# Patient Record
Sex: MALE | Race: WHITE | NOT HISPANIC OR LATINO
[De-identification: names, ages, dates, MRNs, and addresses within clinical notes are randomized per-mention and may not be internally consistent; named-entity substitution may affect disease eponyms.]

---

## 2018-06-28 PROBLEM — Z00.00 ENCOUNTER FOR PREVENTIVE HEALTH EXAMINATION: Status: ACTIVE | Noted: 2018-06-28

## 2020-05-06 ENCOUNTER — APPOINTMENT (OUTPATIENT)
Dept: CARDIOLOGY | Facility: CLINIC | Age: 60
End: 2020-05-06
Payer: COMMERCIAL

## 2020-05-06 VITALS — WEIGHT: 225 LBS | HEIGHT: 73 IN | BODY MASS INDEX: 29.82 KG/M2

## 2020-05-06 DIAGNOSIS — Z87.01 PERSONAL HISTORY OF PNEUMONIA (RECURRENT): ICD-10-CM

## 2020-05-06 DIAGNOSIS — Z78.9 OTHER SPECIFIED HEALTH STATUS: ICD-10-CM

## 2020-05-06 DIAGNOSIS — Z87.09 PERSONAL HISTORY OF OTHER DISEASES OF THE RESPIRATORY SYSTEM: ICD-10-CM

## 2020-05-06 PROCEDURE — 99203 OFFICE O/P NEW LOW 30 MIN: CPT | Mod: 95

## 2020-05-06 RX ORDER — MONTELUKAST SODIUM 10 MG/1
10 TABLET, FILM COATED ORAL
Refills: 0 | Status: ACTIVE | COMMUNITY

## 2020-05-06 RX ORDER — FLUTICASONE PROPIONATE 50 UG/1
SPRAY, METERED NASAL
Refills: 0 | Status: ACTIVE | COMMUNITY

## 2020-05-12 ENCOUNTER — APPOINTMENT (OUTPATIENT)
Dept: CARDIOLOGY | Facility: CLINIC | Age: 60
End: 2020-05-12
Payer: COMMERCIAL

## 2020-05-12 PROCEDURE — 99205 OFFICE O/P NEW HI 60 MIN: CPT | Mod: 95

## 2020-05-13 ENCOUNTER — OUTPATIENT (OUTPATIENT)
Dept: OUTPATIENT SERVICES | Facility: HOSPITAL | Age: 60
LOS: 1 days | End: 2020-05-13
Payer: COMMERCIAL

## 2020-05-13 DIAGNOSIS — Z11.59 ENCOUNTER FOR SCREENING FOR OTHER VIRAL DISEASES: ICD-10-CM

## 2020-05-13 LAB — SARS-COV-2 RNA SPEC QL NAA+PROBE: SIGNIFICANT CHANGE UP

## 2020-05-13 PROCEDURE — U0003: CPT

## 2020-05-14 NOTE — HISTORY OF PRESENT ILLNESS
[Home] : at home, [unfilled] , at the time of the visit. [Medical Office: ([facility])___] : at the medical office located in  [Spouse] : spouse [Patient] : the patient [Self] : self [FreeTextEntry1] : 59 year old male with h/o HTN, HLD reports new onset substernal chest pressure with exertion progressing over last few weeks.  Had episode of pneumonia several weeks ago non COVID and treated and improved..  No current SOB or H/A

## 2020-05-14 NOTE — PHYSICAL EXAM
[General Appearance - Well Developed] : well developed [Normal Appearance] : normal appearance [General Appearance - Well Nourished] : well nourished [Well Groomed] : well groomed [No Deformities] : no deformities [General Appearance - In No Acute Distress] : no acute distress [Normal Conjunctiva] : the conjunctiva exhibited no abnormalities [Eyelids - No Xanthelasma] : the eyelids demonstrated no xanthelasmas [Oriented To Time, Place, And Person] : oriented to person, place, and time [Affect] : the affect was normal [Mood] : the mood was normal [No Anxiety] : not feeling anxious

## 2020-05-14 NOTE — DISCUSSION/SUMMARY
[FreeTextEntry1] : UA- plan cath Friday\par \par HTN- controlled\par \par HLD- labs prior to cath\par \par Followup with Dr Irene

## 2020-05-15 ENCOUNTER — OUTPATIENT (OUTPATIENT)
Dept: OUTPATIENT SERVICES | Facility: HOSPITAL | Age: 60
LOS: 1 days | End: 2020-05-15
Payer: COMMERCIAL

## 2020-05-15 VITALS
RESPIRATION RATE: 18 BRPM | SYSTOLIC BLOOD PRESSURE: 144 MMHG | HEIGHT: 74 IN | WEIGHT: 225.09 LBS | DIASTOLIC BLOOD PRESSURE: 92 MMHG | HEART RATE: 92 BPM | OXYGEN SATURATION: 97 % | TEMPERATURE: 98 F

## 2020-05-15 DIAGNOSIS — Z98.890 OTHER SPECIFIED POSTPROCEDURAL STATES: Chronic | ICD-10-CM

## 2020-05-15 DIAGNOSIS — I20.0 UNSTABLE ANGINA: ICD-10-CM

## 2020-05-15 LAB
ALBUMIN SERPL ELPH-MCNC: 4.4 G/DL — SIGNIFICANT CHANGE UP (ref 3.3–5)
ALP SERPL-CCNC: 93 U/L — SIGNIFICANT CHANGE UP (ref 40–120)
ALT FLD-CCNC: 22 U/L — SIGNIFICANT CHANGE UP (ref 10–45)
ANION GAP SERPL CALC-SCNC: 15 MMOL/L — SIGNIFICANT CHANGE UP (ref 5–17)
AST SERPL-CCNC: 21 U/L — SIGNIFICANT CHANGE UP (ref 10–40)
BILIRUB SERPL-MCNC: 0.8 MG/DL — SIGNIFICANT CHANGE UP (ref 0.2–1.2)
BUN SERPL-MCNC: 14 MG/DL — SIGNIFICANT CHANGE UP (ref 7–23)
CALCIUM SERPL-MCNC: 9.7 MG/DL — SIGNIFICANT CHANGE UP (ref 8.4–10.5)
CHLORIDE SERPL-SCNC: 101 MMOL/L — SIGNIFICANT CHANGE UP (ref 96–108)
CO2 SERPL-SCNC: 23 MMOL/L — SIGNIFICANT CHANGE UP (ref 22–31)
CREAT SERPL-MCNC: 0.97 MG/DL — SIGNIFICANT CHANGE UP (ref 0.5–1.3)
GLUCOSE SERPL-MCNC: 103 MG/DL — HIGH (ref 70–99)
HCT VFR BLD CALC: 44.8 % — SIGNIFICANT CHANGE UP (ref 39–50)
HGB BLD-MCNC: 15.3 G/DL — SIGNIFICANT CHANGE UP (ref 13–17)
MCHC RBC-ENTMCNC: 31.3 PG — SIGNIFICANT CHANGE UP (ref 27–34)
MCHC RBC-ENTMCNC: 34.2 GM/DL — SIGNIFICANT CHANGE UP (ref 32–36)
MCV RBC AUTO: 91.6 FL — SIGNIFICANT CHANGE UP (ref 80–100)
NRBC # BLD: 0 /100 WBCS — SIGNIFICANT CHANGE UP (ref 0–0)
PLATELET # BLD AUTO: 225 K/UL — SIGNIFICANT CHANGE UP (ref 150–400)
POTASSIUM SERPL-MCNC: 3.4 MMOL/L — LOW (ref 3.5–5.3)
POTASSIUM SERPL-SCNC: 3.4 MMOL/L — LOW (ref 3.5–5.3)
PROT SERPL-MCNC: 7.5 G/DL — SIGNIFICANT CHANGE UP (ref 6–8.3)
RBC # BLD: 4.89 M/UL — SIGNIFICANT CHANGE UP (ref 4.2–5.8)
RBC # FLD: 12.9 % — SIGNIFICANT CHANGE UP (ref 10.3–14.5)
SODIUM SERPL-SCNC: 139 MMOL/L — SIGNIFICANT CHANGE UP (ref 135–145)
WBC # BLD: 6.25 K/UL — SIGNIFICANT CHANGE UP (ref 3.8–10.5)
WBC # FLD AUTO: 6.25 K/UL — SIGNIFICANT CHANGE UP (ref 3.8–10.5)

## 2020-05-15 PROCEDURE — 93010 ELECTROCARDIOGRAM REPORT: CPT

## 2020-05-15 PROCEDURE — C1887: CPT

## 2020-05-15 PROCEDURE — 99221 1ST HOSP IP/OBS SF/LOW 40: CPT

## 2020-05-15 PROCEDURE — 93005 ELECTROCARDIOGRAM TRACING: CPT

## 2020-05-15 PROCEDURE — C1894: CPT

## 2020-05-15 PROCEDURE — 99152 MOD SED SAME PHYS/QHP 5/>YRS: CPT

## 2020-05-15 PROCEDURE — 85027 COMPLETE CBC AUTOMATED: CPT

## 2020-05-15 PROCEDURE — C1769: CPT

## 2020-05-15 PROCEDURE — 80053 COMPREHEN METABOLIC PANEL: CPT

## 2020-05-15 PROCEDURE — 93458 L HRT ARTERY/VENTRICLE ANGIO: CPT | Mod: 26

## 2020-05-15 PROCEDURE — 93458 L HRT ARTERY/VENTRICLE ANGIO: CPT

## 2020-05-15 RX ORDER — SODIUM CHLORIDE 9 MG/ML
3 INJECTION INTRAMUSCULAR; INTRAVENOUS; SUBCUTANEOUS EVERY 8 HOURS
Refills: 0 | Status: DISCONTINUED | OUTPATIENT
Start: 2020-05-15 | End: 2020-05-30

## 2020-05-15 NOTE — H&P CARDIOLOGY - PMH
Allergy  seasonal allergy  Asthma    GERD (gastroesophageal reflux disease)    ELIZABETH (obstructive sleep apnea)    Pneumonia

## 2020-05-15 NOTE — H&P CARDIOLOGY - HISTORY OF PRESENT ILLNESS
60 yo  male with PMHx of GERD, Asthma, seasonal allergy, ELIZABETH not on CPAP who presents for cardia cath. pt reports he has been experiencing chest mild discomfort radiate to left shoulder and arm intermittently with SOB, admitted to [facility] on 4/14 for RLL pneumonia. COVID negative at that time, re evaluated by Dr. Irene 5/6 for persistent discomfort with GOMEZ and referred for further cardiac evaluation. COVID negative on 5/13.       Symptoms:        Angina (Class): mild intermittent chest discomfort radiate to left shoulder and arm       Ischemic Symptoms:     Heart Failure: no       Systolic/Diastolic/Combined:        NYHA Class (within 2 weeks):     Assessment of LVEF:       EF: not assessed       Assessed by:        Date:     Prior Cardiac Interventions: none       PCI's:        CABG:     Noninvasive Testing:   Stress Test: Date: not done       Protocol:        Duration of Exercise:        Symptoms:        EKG Changes:        DTS:        Myocardial Imaging:        Risk Assessment:     Echo: not done    Antianginal Therapies: none       Beta Blockers:         Calcium Channel Blockers:        Long Acting Nitrates:        Ranexa:     Associated Risk Factors: HLD       Cerebrovascular Disease: N/A       Chronic Lung Disease: N/A       Peripheral Arterial Disease: N/A       Chronic Kidney Disease (if yes, what is GFR): N/A       Uncontrolled Diabetes (if yes, what is HgbA1C or FBS): N/A       Poorly Controlled Hypertension (if yes, what is SBP): N/A       Morbid Obesity (if yes, what is BMI): N/A       History of Recent Ventricular Arrhythmia: N/A       Inability to Ambulate Safely: N/A       Need for Therapeutic Anticoagulation: N/A       Antiplatelet or Contrast Allergy: N/A

## 2020-05-15 NOTE — H&P CARDIOLOGY - FAMILY HISTORY
Mother  Still living? No  Family history of early liver disease, Age at diagnosis: Age Unknown     Father  Still living? No  Atrial fibrillation, Age at diagnosis: Age Unknown  Family history of cardiac pacemaker, Age at diagnosis: Age Unknown     Sibling  Still living? No  Family history of congenital heart disease, Age at diagnosis: Age Unknown

## 2020-05-19 PROBLEM — G47.33 OBSTRUCTIVE SLEEP APNEA (ADULT) (PEDIATRIC): Chronic | Status: ACTIVE | Noted: 2020-05-15

## 2020-05-19 PROBLEM — J18.9 PNEUMONIA, UNSPECIFIED ORGANISM: Chronic | Status: ACTIVE | Noted: 2020-05-15

## 2020-05-19 PROBLEM — T78.40XA ALLERGY, UNSPECIFIED, INITIAL ENCOUNTER: Chronic | Status: ACTIVE | Noted: 2020-05-15

## 2020-05-19 PROBLEM — J45.909 UNSPECIFIED ASTHMA, UNCOMPLICATED: Chronic | Status: ACTIVE | Noted: 2020-05-15

## 2020-05-19 PROBLEM — K21.9 GASTRO-ESOPHAGEAL REFLUX DISEASE WITHOUT ESOPHAGITIS: Chronic | Status: ACTIVE | Noted: 2020-05-15

## 2020-05-24 NOTE — HISTORY OF PRESENT ILLNESS
[Home] : at home, [unfilled] , at the time of the visit. [Medical Office: ([facility])___] : at the medical office located in  [Spouse] : spouse [FreeTextEntry1] : Patient requested a TELEHEALTH contact at this time to discuss specific cardiovascular issues and associated risk factors. This contact took place via TELEHEALTH link utilizing [employer] Touchpoint software. Consent was obtained from the patient in advance of this communication. The consent form can be found in the "OTHER CONSENTS" section of the patient's medical record. Time spent on this communication was approximately: 40 minutes\par

## 2020-05-28 ENCOUNTER — APPOINTMENT (OUTPATIENT)
Dept: CARDIOLOGY | Facility: CLINIC | Age: 60
End: 2020-05-28

## 2020-05-30 ENCOUNTER — APPOINTMENT (OUTPATIENT)
Dept: CARDIOLOGY | Facility: CLINIC | Age: 60
End: 2020-05-30
Payer: COMMERCIAL

## 2020-05-30 VITALS
WEIGHT: 221 LBS | SYSTOLIC BLOOD PRESSURE: 126 MMHG | RESPIRATION RATE: 14 BRPM | BODY MASS INDEX: 29.29 KG/M2 | HEIGHT: 73 IN | DIASTOLIC BLOOD PRESSURE: 82 MMHG | HEART RATE: 52 BPM

## 2020-05-30 DIAGNOSIS — R07.9 CHEST PAIN, UNSPECIFIED: ICD-10-CM

## 2020-05-30 DIAGNOSIS — K21.9 GASTRO-ESOPHAGEAL REFLUX DISEASE W/OUT ESOPHAGITIS: ICD-10-CM

## 2020-05-30 PROCEDURE — 99214 OFFICE O/P EST MOD 30 MIN: CPT

## 2020-05-30 PROCEDURE — 93000 ELECTROCARDIOGRAM COMPLETE: CPT

## 2020-06-01 NOTE — ASSESSMENT
[FreeTextEntry1] : \par 1.  EKG today demonstrates sinus bradycardia at 52 bpm.  Q-waves in leads II, III, and aVF (? old inferior wall MI).  No acute ischemic changes. \par \par 3.  Chest pain:  Patient status-post diagnostic cardiac catheterization at [facility] where he was found to have a 30% stenosis in the mid-LAD and no other evidence of disease.  Left ventriculography apparently not performed at the time.  Will follow up with echocardiography as a baseline and also to further assess the inferior Qs noted on EKG.  Patient without prior history of MI.  \par \par 4.  Hyperlipidemia:  Patient currently on Atorvastatin 40 mg daily.  Will undergo fasting lipid profile prior to his next office visit. \par \par 5.  Occasional chest discomfort:  Likely represents GERD or possibly asthma at this point given the cardiac cath findings.  Patient advised to follow up with his PCP and possibly GI and pulmonary.  If clinically stable, office visit two months. \par

## 2020-06-01 NOTE — REASON FOR VISIT
[FreeTextEntry1] : Mr. Castle is a pleasant 59-year-old white male with a past medical history significant for hyperlipidemia, asthma, GERD, and sleep apnea, whom recently underwent a diagnostic cardiac catheterization at [facility] for classic symptoms of exertional angina.  Patient was found to have non-obstructive coronary artery disease with a 30% mid-LAD stenosis and no other significant obstructive disease.  Normal left ventricular function was noted.  \par \par

## 2020-06-01 NOTE — PHYSICAL EXAM
[General Appearance - Well Developed] : well developed [General Appearance - In No Acute Distress] : no acute distress [Normal Conjunctiva] : the conjunctiva exhibited no abnormalities [Normal Oral Mucosa] : normal oral mucosa [Heart Rate And Rhythm] : heart rate and rhythm were normal [] : no respiratory distress [Heart Sounds] : normal S1 and S2 [Bowel Sounds] : normal bowel sounds [Abnormal Walk] : normal gait [Skin Color & Pigmentation] : normal skin color and pigmentation [Skin Turgor] : normal skin turgor [Oriented To Time, Place, And Person] : oriented to person, place, and time [Affect] : the affect was normal [Mood] : the mood was normal [FreeTextEntry1] : No edema

## 2020-06-01 NOTE — HISTORY OF PRESENT ILLNESS
[FreeTextEntry1] :  From a cardiac standpoint, Mr. Castle feels well but continues to experience some intermittent episodes of chest discomfort.  I suspect that these are related to underlying asthma and possibly to his GERD.  We had a lengthy discussion regarding the need for avoiding certain foods as well as time that he had dinner and what his sleeping arrangement looks like.

## 2020-06-23 ENCOUNTER — APPOINTMENT (OUTPATIENT)
Dept: CARDIOLOGY | Facility: CLINIC | Age: 60
End: 2020-06-23
Payer: COMMERCIAL

## 2020-06-23 PROCEDURE — 93306 TTE W/DOPPLER COMPLETE: CPT

## 2020-07-16 ENCOUNTER — APPOINTMENT (OUTPATIENT)
Dept: CARDIOLOGY | Facility: CLINIC | Age: 60
End: 2020-07-16

## 2020-07-24 ENCOUNTER — APPOINTMENT (OUTPATIENT)
Dept: CARDIOLOGY | Facility: CLINIC | Age: 60
End: 2020-07-24
Payer: COMMERCIAL

## 2020-07-24 VITALS — WEIGHT: 216 LBS | HEIGHT: 73 IN | BODY MASS INDEX: 28.63 KG/M2

## 2020-07-24 DIAGNOSIS — R42 DIZZINESS AND GIDDINESS: ICD-10-CM

## 2020-07-24 PROCEDURE — 99214 OFFICE O/P EST MOD 30 MIN: CPT | Mod: 95

## 2020-07-24 RX ORDER — RABEPRAZOLE SODIUM 20 MG/1
20 TABLET, DELAYED RELEASE ORAL
Refills: 0 | Status: DISCONTINUED | COMMUNITY
End: 2020-07-24

## 2020-07-24 RX ORDER — NITROGLYCERIN 0.4 MG/1
0.4 TABLET SUBLINGUAL
Qty: 90 | Refills: 3 | Status: DISCONTINUED | COMMUNITY
Start: 2020-05-06 | End: 2020-07-24

## 2020-07-24 NOTE — PHYSICAL EXAM
[General Appearance - Well Developed] : well developed [General Appearance - Well Nourished] : well nourished [General Appearance - In No Acute Distress] : no acute distress [Normal Conjunctiva] : the conjunctiva exhibited no abnormalities [] : no respiratory distress [Skin Color & Pigmentation] : normal skin color and pigmentation [Oriented To Time, Place, And Person] : oriented to person, place, and time [Mood] : the mood was normal [Affect] : the affect was normal

## 2020-07-28 NOTE — HISTORY OF PRESENT ILLNESS
[Home] : at home, [unfilled] , at the time of the visit. [Medical Office: ([facility])___] : at the medical office located in  [FreeTextEntry1] : Patient requested a TELEHEALTH contact at this time to discuss specific cardiovascular issues and associated risk factors. This contact took place via TELEHEALTH link utilizing [employer] Touchpoint software. Consent was obtained from the patient in advance of this communication. The consent form can be found in the "OTHER CONSENTS" section of the patient's medical record. Time spent on this communication was approximately: 25 minutes.\par \par From a cardiac standpoint, Mr. Castle denies exertional chest pain or shortness of breath.  He has been experiencing some positional lightheadedness but no syncope.  He states that he will undergo a cholecystectomy in the near future.

## 2020-07-28 NOTE — REASON FOR VISIT
[FreeTextEntry1] : Mr. Castle is a pleasant 59-year-old white male with a past medical history significant for hyperlipidemia, mild coronary artery disease with a noted 30% mid-LAD stenosis on cardiac catheterization this year as well as asthma, GERD, and sleep apnea, who presents for telemedicine follow up assessment.  \par \par

## 2020-10-01 ENCOUNTER — NON-APPOINTMENT (OUTPATIENT)
Age: 60
End: 2020-10-01

## 2020-10-05 ENCOUNTER — RX RENEWAL (OUTPATIENT)
Age: 60
End: 2020-10-05

## 2020-10-06 ENCOUNTER — APPOINTMENT (OUTPATIENT)
Dept: CARDIOLOGY | Facility: CLINIC | Age: 60
End: 2020-10-06
Payer: COMMERCIAL

## 2020-10-06 PROCEDURE — 93880 EXTRACRANIAL BILAT STUDY: CPT

## 2020-10-19 ENCOUNTER — APPOINTMENT (OUTPATIENT)
Dept: CARDIOLOGY | Facility: CLINIC | Age: 60
End: 2020-10-19
Payer: COMMERCIAL

## 2020-10-19 VITALS
HEIGHT: 73 IN | TEMPERATURE: 98.8 F | HEART RATE: 67 BPM | SYSTOLIC BLOOD PRESSURE: 100 MMHG | DIASTOLIC BLOOD PRESSURE: 68 MMHG | BODY MASS INDEX: 27.17 KG/M2 | RESPIRATION RATE: 14 BRPM | WEIGHT: 205 LBS

## 2020-10-19 PROCEDURE — 93000 ELECTROCARDIOGRAM COMPLETE: CPT

## 2020-10-19 PROCEDURE — 99214 OFFICE O/P EST MOD 30 MIN: CPT

## 2020-10-19 RX ORDER — FLUTICASONE FUROATE AND VILANTEROL TRIFENATATE 200; 25 UG/1; UG/1
200-25 POWDER RESPIRATORY (INHALATION)
Refills: 0 | Status: DISCONTINUED | COMMUNITY
End: 2020-10-19

## 2020-10-19 RX ORDER — LEVALBUTEROL TARTRATE 45 UG/1
45 AEROSOL, METERED ORAL
Refills: 0 | Status: DISCONTINUED | COMMUNITY
End: 2020-10-19

## 2020-10-20 NOTE — HISTORY OF PRESENT ILLNESS
[FreeTextEntry1] : From a cardiac standpoint, Mr. Castle denies exertional chest pain, shortness of breath, palpitations, lightheadedness, or syncope.  He recently underwent carotid duplex for assessment of peripheral vascular disease.

## 2020-10-20 NOTE — ASSESSMENT
[FreeTextEntry1] : 1.  EKG today reveals normal sinus rhythm at 67 bpm.  Normal intervals.  No evidence of ischemia. \par \par 2.  Non-obstructive coronary artery disease:  Patient without chest pain or shortness of breath and physically active. \par \par 3.  Hyperlipidemia:  Recent lipid profile demonstrates a total cholesterol of 139, HDL 55, LDL 70, triglycerides 59.  Patient is advised to continue with current medications as well as follow low-fat / low-cholesterol diet.  \par \par 4.  Peripheral vascular disease:  Recent carotid duplex revealed mild plaquing of the right internal carotid artery.  The left internal carotid artery is normal.  No other significant findings.  Given these findings, patient was encouraged to continue current statin therapy as well as low-dose aspirin.  \par \par 5.  Shortness of breath while bending over:  Patient admits to shortness of breath only while bending over to pick things up or tie his shows.  I have discussed this with him and have told him that essentially this is a mechanical issue and not a cardiopulmonary problem.  Patient advised on further weight loss through a low-caloric diet and regular weight loss. \par \par 6.  Family history of ruptured abdominal aortic aneurysm:  Patient states that his father  from a ruptured aortic aneurysm and is concerned about his aorta.  Will schedule abdominal ultrasound to be performed at some point between now and his next office visit.  \par

## 2020-10-20 NOTE — PHYSICAL EXAM
[General Appearance - Well Developed] : well developed [General Appearance - In No Acute Distress] : no acute distress [General Appearance - Well Nourished] : well nourished [Normal Conjunctiva] : the conjunctiva exhibited no abnormalities [Normal Oral Mucosa] : normal oral mucosa [] : no respiratory distress [Heart Rate And Rhythm] : heart rate and rhythm were normal [Heart Sounds] : normal S1 and S2 [Bowel Sounds] : normal bowel sounds [Abnormal Walk] : normal gait [Skin Color & Pigmentation] : normal skin color and pigmentation [Oriented To Time, Place, And Person] : oriented to person, place, and time [Affect] : the affect was normal [Mood] : the mood was normal [FreeTextEntry1] : No edema

## 2020-10-20 NOTE — REASON FOR VISIT
[FreeTextEntry1] : Mr. Castle is a pleasant 60-year-old white male with a past medical history significant for hyperlipidemia as well as mild coronary artery disease with noted 30% mid-LAD stenosis on cardiac catheterization earlier this year as well as a history of asthma, GERD, and sleep apnea, who presents for follow up evaluation.  \par \par

## 2021-04-19 ENCOUNTER — APPOINTMENT (OUTPATIENT)
Dept: CARDIOLOGY | Facility: CLINIC | Age: 61
End: 2021-04-19
Payer: COMMERCIAL

## 2021-04-19 VITALS
SYSTOLIC BLOOD PRESSURE: 126 MMHG | TEMPERATURE: 98 F | HEART RATE: 81 BPM | RESPIRATION RATE: 14 BRPM | WEIGHT: 210 LBS | HEIGHT: 73 IN | DIASTOLIC BLOOD PRESSURE: 60 MMHG | BODY MASS INDEX: 27.83 KG/M2

## 2021-04-19 PROCEDURE — 99072 ADDL SUPL MATRL&STAF TM PHE: CPT

## 2021-04-19 PROCEDURE — 99214 OFFICE O/P EST MOD 30 MIN: CPT

## 2021-04-19 PROCEDURE — 93000 ELECTROCARDIOGRAM COMPLETE: CPT

## 2021-04-19 RX ORDER — ASPIRIN 81 MG/1
81 TABLET, CHEWABLE ORAL
Qty: 90 | Refills: 3 | Status: DISCONTINUED | COMMUNITY
Start: 2020-05-06 | End: 2021-04-19

## 2021-04-19 RX ORDER — FAMOTIDINE 10 MG/1
TABLET, FILM COATED ORAL
Refills: 0 | Status: DISCONTINUED | COMMUNITY
End: 2021-04-19

## 2021-04-21 NOTE — ASSESSMENT
[FreeTextEntry1] : 1.  EKG today reveals sinus rhythm at 81 bpm.  Qs in leads II, III, and aVF, with tall R-waves in leads V1, V2 suggesting prior inferoposterior infarct.  These EKG changes do not correlate with echocardiographic findings from last year where he is noted to have normal left ventricular dimensions and wall motion without segmental abnormalities noted at either the inferior or posterior segments.  Normal left ventricular ejection fraction noted.\par \par 2.  Hyperlipidemia:  Patient recently underwent fasting bloodwork which revealed a total cholesterol of 143, HDL 55, LDL 74, triglycerides 70.  Patient is advised to continue with current statin therapy as well as low-dose aspirin given his known non-obstructive coronary artery disease.  Regular aerobic exercise was discussed as well.  If clinically stable, will have patient return in approximately 12 months for reassessment.   \par \par \par

## 2021-04-21 NOTE — HISTORY OF PRESENT ILLNESS
[FreeTextEntry1] : From a cardiac standpoint, Mr. Castle denies exertional chest pain, shortness of breath, palpitations, lightheadedness, or syncope, and remains physically active.

## 2021-04-21 NOTE — REASON FOR VISIT
[FreeTextEntry1] : Mr. Castle is a pleasant 60-year-old white male with a past medical history significant for hyperlipidemia as well as non-obstructive coronary artery disease involving his LAD, asthma, GERD, and sleep apnea, who presents for follow up evaluation.  \par \par

## 2021-07-26 ENCOUNTER — RX RENEWAL (OUTPATIENT)
Age: 61
End: 2021-07-26

## 2021-09-07 ENCOUNTER — EMERGENCY (EMERGENCY)
Facility: HOSPITAL | Age: 61
LOS: 0 days | Discharge: HOME | End: 2021-09-07
Payer: COMMERCIAL

## 2021-09-07 VITALS
RESPIRATION RATE: 18 BRPM | HEART RATE: 65 BPM | DIASTOLIC BLOOD PRESSURE: 81 MMHG | OXYGEN SATURATION: 98 % | SYSTOLIC BLOOD PRESSURE: 120 MMHG | TEMPERATURE: 98 F

## 2021-09-07 VITALS — OXYGEN SATURATION: 100 % | HEIGHT: 73 IN | WEIGHT: 214.95 LBS
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
## 2021-09-07 DIAGNOSIS — S09.90XA UNSPECIFIED INJURY OF HEAD, INITIAL ENCOUNTER: ICD-10-CM

## 2021-09-07 DIAGNOSIS — J45.909 UNSPECIFIED ASTHMA, UNCOMPLICATED: ICD-10-CM

## 2021-09-07 DIAGNOSIS — Z98.890 OTHER SPECIFIED POSTPROCEDURAL STATES: Chronic | ICD-10-CM

## 2021-09-07 DIAGNOSIS — K21.9 GASTRO-ESOPHAGEAL REFLUX DISEASE WITHOUT ESOPHAGITIS: ICD-10-CM
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
## 2021-09-07 DIAGNOSIS — G47.33 OBSTRUCTIVE SLEEP APNEA (ADULT) (PEDIATRIC): ICD-10-CM

## 2021-09-07 DIAGNOSIS — S01.01XA LACERATION WITHOUT FOREIGN BODY OF SCALP, INITIAL ENCOUNTER: ICD-10-CM

## 2021-09-07 PROCEDURE — 99283 EMERGENCY DEPT VISIT LOW MDM: CPT | Mod: 25

## 2021-09-07 PROCEDURE — 12002 RPR S/N/AX/GEN/TRNK2.6-7.5CM: CPT

## 2021-09-07 NOTE — ED ADULT TRIAGE NOTE - CHIEF COMPLAINT QUOTE
pt a&O x4 walk in from home. C.o head injury today to metal object. puncture wound to anterior scalp no bleeding at this time. NO LOC. pt received tetanus within last 10 years. no dizziness. pain 3/10.

## 2021-09-07 NOTE — ED PROVIDER NOTE - NS ED ROS FT
Constitutional: (-) Fever, (-) Chills  Skin: (+) Wounds  Eyes: (-) Visual changes, (-) Discharge, (-) Redness  Ears: (-) Hearing loss, (-)Tinnitus, (-) Ear pain  CV: (-) Chest pain, (-) Palpitations  Resp: (-) Cough, (-) Shortness of breath  GI: (-) Abdominal pain, (-) Nausea, (-) Vomiting  : (-) Dysuria  MSK: (-) Myalgias, (-) Neck pain  Neuro: (-) Loss of consciousness, (-) Headache, (-) Confusion

## 2021-09-07 NOTE — ED PROVIDER NOTE - OBJECTIVE STATEMENT
61y Male with no sig PMHx presents to the ER for head injury. Reports doing work in his basement, when he stood up and hit his head on a metal corner. Denies loosening consciousness or falling and hitting his head. Denies blood thinners. Reports cut to the back of the head, bleeding controlled. Denies headache, dizziness, lightheadedness, acute changes in vision, nausea or vomiting. Tetanus within the last 7 years.

## 2021-09-07 NOTE — ED ADULT NURSE NOTE - NSICDXFAMILYHX_GEN_ALL_CORE_FT
FAMILY HISTORY:  Father  Still living? No  Atrial fibrillation, Age at diagnosis: Age Unknown  Family history of cardiac pacemaker, Age at diagnosis: Age Unknown    Mother  Still living? No  Family history of early liver disease, Age at diagnosis: Age Unknown    Sibling  Still living? No  Family history of congenital heart disease, Age at diagnosis: Age Unknown

## 2021-09-07 NOTE — ED ADULT NURSE NOTE - NSICDXPASTMEDICALHX_GEN_ALL_CORE_FT
PAST MEDICAL HISTORY:  Allergy seasonal allergy    Asthma     GERD (gastroesophageal reflux disease)     ELIZABETH (obstructive sleep apnea)     Pneumonia

## 2021-09-07 NOTE — ED PROVIDER NOTE - NSFOLLOWUPINSTRUCTIONS_ED_ALL_ED_FT
Today you were seen in the ER for laceration of the scalp.     RETURN TO THE ER IN 10-14 DAYS FOR REMOVAL OF STAPLES.     Keep area clean and dry for the first 24 hours. After, let soap and water run down area. Do not scrub.     Laceration    A laceration is a cut that goes through all of the layers of the skin and into the tissue that is right under the skin. Some lacerations heal on their own. Others need to be closed with skin adhesive strips, skin glue, stitches (sutures), or staples. Proper laceration care minimizes the risk of infection and helps the laceration to heal better.  If non-absorbable stitches or staples have been placed, they must be taken out within the time frame instructed by your healthcare provider.    SEEK IMMEDIATE MEDICAL CARE IF YOU HAVE ANY OF THE FOLLOWING SYMPTOMS: swelling around the wound, worsening pain, drainage from the wound, red streaking going away from your wound, inability to move finger or toe near the laceration, or discoloration of skin near the laceration, lightheadedness, dizziness, headache, nausea or vomiting.    Take Tylenol 650mg (Two 325 mg pills) every 4-6 hours as needed for pain.    Take Motrin 600 mg every 8 hours as needed for moderate pain-- take with food.    Advance activity as tolerated.     Continue all previously prescribed medications as directed unless otherwise instructed.     Follow up with your primary care physician in 48-72 hours- bring copies of your results.

## 2021-09-07 NOTE — ED PROVIDER NOTE - PATIENT PORTAL LINK FT
You can access the FollowMyHealth Patient Portal offered by [facility] by registering at the following website: [email or web address]. By joining [facility]’s FollowMyHealth portal, you will also be able to view your health information using other applications (apps) compatible with our system.

## 2021-09-14 ENCOUNTER — EMERGENCY (EMERGENCY)
Facility: HOSPITAL | Age: 61
LOS: 0 days | Discharge: HOME | End: 2021-09-14
Payer: COMMERCIAL

## 2021-09-14 VITALS
HEIGHT: 73 IN | HEART RATE: 56 BPM | WEIGHT: 214.95 LBS | OXYGEN SATURATION: 98 % | RESPIRATION RATE: 20 BRPM | DIASTOLIC BLOOD PRESSURE: 77 MMHG | TEMPERATURE: 98 F | SYSTOLIC BLOOD PRESSURE: 120 MMHG

## 2021-09-14 DIAGNOSIS — Z98.890 OTHER SPECIFIED POSTPROCEDURAL STATES: Chronic | ICD-10-CM

## 2021-09-14 DIAGNOSIS — S01.01XD LACERATION WITHOUT FOREIGN BODY OF SCALP, SUBSEQUENT ENCOUNTER: ICD-10-CM

## 2021-09-14 DIAGNOSIS — G47.33 OBSTRUCTIVE SLEEP APNEA (ADULT) (PEDIATRIC): ICD-10-CM
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
## 2021-09-14 DIAGNOSIS — Z87.01 PERSONAL HISTORY OF PNEUMONIA (RECURRENT): ICD-10-CM

## 2021-09-14 DIAGNOSIS — Z48.02 ENCOUNTER FOR REMOVAL OF SUTURES: ICD-10-CM

## 2021-09-14 DIAGNOSIS — J45.909 UNSPECIFIED ASTHMA, UNCOMPLICATED: ICD-10-CM

## 2021-09-14 DIAGNOSIS — I25.10 ATHEROSCLEROTIC HEART DISEASE OF NATIVE CORONARY ARTERY WITHOUT ANGINA PECTORIS: ICD-10-CM

## 2021-09-14 PROCEDURE — L9995: CPT

## 2021-09-14 RX ORDER — MONTELUKAST 4 MG/1
1 TABLET, CHEWABLE ORAL
Qty: 0 | Refills: 0 | DISCHARGE

## 2021-09-14 RX ORDER — FLUTICASONE PROPIONATE 50 MCG
1 SPRAY, SUSPENSION NASAL
Qty: 0 | Refills: 0 | DISCHARGE

## 2021-09-14 RX ORDER — ASPIRIN/CALCIUM CARB/MAGNESIUM 324 MG
1 TABLET ORAL
Qty: 0 | Refills: 0 | DISCHARGE

## 2021-09-14 RX ORDER — CETIRIZINE HYDROCHLORIDE 10 MG/1
1 TABLET ORAL
Qty: 0 | Refills: 0 | DISCHARGE

## 2021-09-14 RX ORDER — ATORVASTATIN CALCIUM 80 MG/1
1 TABLET, FILM COATED ORAL
Qty: 0 | Refills: 0 | DISCHARGE

## 2021-09-14 RX ORDER — FAMOTIDINE 10 MG/ML
1 INJECTION INTRAVENOUS
Qty: 0 | Refills: 0 | DISCHARGE

## 2021-09-14 RX ORDER — FLUTICASONE FUROATE AND VILANTEROL TRIFENATATE 100; 25 UG/1; UG/1
1 POWDER RESPIRATORY (INHALATION)
Qty: 0 | Refills: 0 | DISCHARGE

## 2021-09-14 RX ORDER — AZELASTINE 137 UG/1
2 SPRAY, METERED NASAL
Qty: 0 | Refills: 0 | DISCHARGE

## 2021-09-14 NOTE — ED PROVIDER NOTE - NSFOLLOWUPINSTRUCTIONS_ED_ALL_ED_FT
Today you were seen in the ER for staple removal.     Advance activity as tolerated.     Continue all previously prescribed medications as directed unless otherwise instructed.     Follow up with your primary care physician in 48-72 hours- bring copies of your results.     Return to the ER for worsening or persistent symptoms, and/or ANY NEW OR CONCERNING SYMPTOMS including but not limited to fever, chills, redness, swelling, pus drainage, headache or dizziness.

## 2021-09-14 NOTE — ED PROVIDER NOTE - OBJECTIVE STATEMENT
61y Male with no sig PMH presents to the ER for staple removal. Patient reports hitting head last week, receiving 3 staples in scalp. Denies fever, chills, pus drainage, headache, nausea, vomiting.

## 2021-09-14 NOTE — ED ADULT NURSE NOTE - NSIMPLEMENTINTERV_GEN_ALL_ED
Implemented All Universal Safety Interventions:  [place] to call system. Call bell, personal items and telephone within reach. Instruct patient to call for assistance. Room bathroom lighting operational. Non-slip footwear when patient is off stretcher. Physically safe environment: no spills, clutter or unnecessary equipment. Stretcher in lowest position, wheels locked, appropriate side rails in place.

## 2021-09-14 NOTE — ED PROVIDER NOTE - CLINICAL SUMMARY MEDICAL DECISION MAKING FREE TEXT BOX
61y Male with no sig PMH presents to the ER for staple removal. 3 staples to scalp. Denies fever, chills, pus drainage, headache, nausea, vomiting. Will remove sutures and discharge.

## 2021-09-14 NOTE — ED ADULT NURSE NOTE - CAS TRG GENERAL NORM CIRC DET
Please do another GI referral for Dr. Ace Crawley we have one for Dr. Gamez, but patient has appt with Dr Crawley tomorrow. Thank You.  
Strong peripheral pulses

## 2021-09-14 NOTE — ED PROVIDER NOTE - SKIN, MLM
Skin normal color for race, warm, dry and intact. No evidence of rash. Well healed laceration to scalp. 3 staples present, no active bleeding, swelling or pus drainage.

## 2021-09-14 NOTE — ED PROVIDER NOTE - NS ED ROS FT
Constitutional: (-) Fever, (-) Chills  Skin: (-) Color changes, (-) Rashes, (-) Wounds  Eyes: (-) Visual changes, (-) Discharge, (-) Redness  Ears: (-) Hearing loss, (-)Tinnitus  CV: (-) Chest pain  Resp: (-) Cough, (-) Shortness of breath  GI: (-) Abdominal pain, (-) Nausea, (-) Vomiting  : (-) Dysuria   MSK: (-) Myalgias  Neuro: (-) Loss of consciousness, (-) Headache

## 2021-09-14 NOTE — ED PROVIDER NOTE - PATIENT PORTAL LINK FT
You can access the FollowMyHealth Patient Portal offered by [facility] by registering at the following website: [email or web address]. By joining [facility]’s FollowMyHealth portal, you will also be able to view your health information using other applications (apps) compatible with our system.

## 2021-09-14 NOTE — ED PROVIDER NOTE - NSICDXPASTMEDICALHX_GEN_ALL_CORE_FT
PAST MEDICAL HISTORY:  Allergy seasonal allergy    Asthma     CAD S/P percutaneous coronary angioplasty     CAD S/P percutaneous coronary angioplasty     GERD (gastroesophageal reflux disease)     ELIZABETH (obstructive sleep apnea)     Pneumonia

## 2022-04-11 ENCOUNTER — APPOINTMENT (OUTPATIENT)
Dept: CARDIOLOGY | Facility: CLINIC | Age: 62
End: 2022-04-11
Payer: COMMERCIAL

## 2022-04-11 VITALS
RESPIRATION RATE: 14 BRPM | DIASTOLIC BLOOD PRESSURE: 82 MMHG | SYSTOLIC BLOOD PRESSURE: 128 MMHG | HEIGHT: 73 IN | BODY MASS INDEX: 30.35 KG/M2 | WEIGHT: 229 LBS | HEART RATE: 56 BPM

## 2022-04-11 PROCEDURE — 99214 OFFICE O/P EST MOD 30 MIN: CPT

## 2022-04-11 PROCEDURE — 93000 ELECTROCARDIOGRAM COMPLETE: CPT

## 2022-04-13 NOTE — ASSESSMENT
[FreeTextEntry1] : 1.  EKG today reveals sinus bradycardia at 56 bpm.  Qs in leads II, III, and aVF.  Tall Rs in leads V1, V2.  These are chronic EKG changes for this patient who has normal wall motion on echocardiography. \par \par 2.  Non-[id number] coronary artery disease:  Clinically stable without chest pain or shortness of breath. \par \par 3.  Hyperlipidemia:  Patient recently underwent fasting bloodwork through his PCP’s office.  Attempting to obtain those labs now for review.  I have advised him to follow a strict low-fat / low-cholesterol diet and continue current statin therapy.  \par \par 4.  Recent weight gain:  Clinically not underactive but clearly following a very carbohydrate-rich diet.  I have advised him to begin cutting back on carbs while continuing his exercise.  If clinically stable, office visit 12 months.

## 2022-04-13 NOTE — HISTORY OF PRESENT ILLNESS
[FreeTextEntry1] : From a cardiac standpoint, Mr. Castle denies exertional chest pain, shortness of breath, or other cardiac symptoms.  He states that he is physically active but has gained some 15 pounds since last year from a poor diet high in carbohydrates.  \par \par \par

## 2022-04-13 NOTE — REASON FOR VISIT
[FreeTextEntry1] : Mr. Castle is a pleasant 61-year-old white male with a past medical history significant for hyperlipidemia as well as non-obstructive coronary artery disease with known 30% stenosis of his LAD as well as asthma, GERD, and sleep apnea, who presents for follow up evaluation.  \par \par

## 2022-05-02 ENCOUNTER — RX RENEWAL (OUTPATIENT)
Age: 62
End: 2022-05-02

## 2022-05-02 ENCOUNTER — APPOINTMENT (OUTPATIENT)
Dept: ORTHOPEDIC SURGERY | Facility: CLINIC | Age: 62
End: 2022-05-02
Payer: COMMERCIAL

## 2022-05-02 VITALS — WEIGHT: 225 LBS | BODY MASS INDEX: 29.82 KG/M2 | HEIGHT: 73 IN

## 2022-05-02 PROCEDURE — 99214 OFFICE O/P EST MOD 30 MIN: CPT

## 2022-05-02 NOTE — REASON FOR VISIT
[FreeTextEntry2] : pt states RT ankle has been bothering them since last week. states there was no injury or fall

## 2022-05-02 NOTE — ASSESSMENT
[FreeTextEntry1] : wbat\par MDP\par arch support\par PT\par f/up 1 months\par \par A Medrol dose Temo was prescribed today. Patient understands that while taking the Medrol, they shouldn't be taking any other anti-inflammatories. Pt understands and all questions were answered.\par \par

## 2022-05-02 NOTE — IMAGING
[Left] : left ankle [Weight -] : weightbearing [There are no fractures, subluxations or dislocations. No significant abnormalities are seen] : There are no fractures, subluxations or dislocations. No significant abnormalities are seen

## 2022-05-02 NOTE — HISTORY OF PRESENT ILLNESS
[5] : 5 [1] : 2 [Dull/Aching] : dull/aching [Sharp] : sharp [Shooting] : shooting [Tingling] : tingling [Constant] : constant [Meds] : meds [Ice] : ice [de-identified] : 5/2/22: Patient is a 60 yo male c/o right ankle pain for 10 days with no specific injury. Was seen by [facility] where they took xrays. Having n/t in toes. Taking tylenol as needed which gives little relief. Pain is worse with walking. No previous injuries or surgeries to right ankle. no dm/tob. Occupation: Property Management  [] : no [FreeTextEntry7] : up the leg and into the toes [de-identified] : 4/26/22 [de-identified] : [facility] [de-identified] : x-ray  [de-identified] : ankle brace

## 2022-05-02 NOTE — PHYSICAL EXAM
[Right] : right foot and ankle [NL (40)] : plantar flexion 40 degrees [NL 30)] : inversion 30 degrees [NL (20)] : eversion 20 degrees [5___] : eversion 5[unfilled]/5 [2+] : dorsalis pedis pulse: 2+ [] : non-antalgic

## 2022-06-06 ENCOUNTER — APPOINTMENT (OUTPATIENT)
Dept: ORTHOPEDIC SURGERY | Facility: CLINIC | Age: 62
End: 2022-06-06

## 2022-10-04 ENCOUNTER — APPOINTMENT (OUTPATIENT)
Dept: ULTRASOUND IMAGING | Facility: CLINIC | Age: 62
End: 2022-10-04

## 2022-10-04 PROCEDURE — 76700 US EXAM ABDOM COMPLETE: CPT

## 2022-10-07 ENCOUNTER — TRANSCRIPTION ENCOUNTER (OUTPATIENT)
Age: 62
End: 2022-10-07

## 2023-03-27 ENCOUNTER — APPOINTMENT (OUTPATIENT)
Dept: CARDIOLOGY | Facility: CLINIC | Age: 63
End: 2023-03-27
Payer: COMMERCIAL

## 2023-03-27 VITALS
HEIGHT: 73 IN | WEIGHT: 227 LBS | DIASTOLIC BLOOD PRESSURE: 78 MMHG | HEART RATE: 66 BPM | SYSTOLIC BLOOD PRESSURE: 122 MMHG | BODY MASS INDEX: 30.09 KG/M2 | RESPIRATION RATE: 14 BRPM

## 2023-03-27 DIAGNOSIS — M25.571 PAIN IN RIGHT ANKLE AND JOINTS OF RIGHT FOOT: ICD-10-CM

## 2023-03-27 DIAGNOSIS — R29.91 UNSPECIFIED SYMPTOMS AND SIGNS INVOLVING THE MUSCULOSKELETAL SYSTEM: ICD-10-CM

## 2023-03-27 PROCEDURE — 99214 OFFICE O/P EST MOD 30 MIN: CPT | Mod: 25

## 2023-03-27 PROCEDURE — 93000 ELECTROCARDIOGRAM COMPLETE: CPT

## 2023-03-27 RX ORDER — ATORVASTATIN CALCIUM 40 MG/1
40 TABLET, FILM COATED ORAL
Qty: 90 | Refills: 3 | Status: DISCONTINUED | COMMUNITY
Start: 2020-05-06 | End: 2023-03-27

## 2023-03-28 PROBLEM — M25.571 SINUS TARSI SYNDROME OF RIGHT ANKLE: Status: ACTIVE | Noted: 2022-05-02

## 2023-03-29 NOTE — REASON FOR VISIT
[FreeTextEntry1] : Mr. Castle is a pleasant 62-year-old white male with a past medical history significant for hyperlipidemia as well as non-obstructive coronary artery disease with known 30% stenosis of his LAD as well as asthma, GERD, and sleep apnea, who presents for follow up evaluation.  \par \par

## 2023-03-29 NOTE — ASSESSMENT
[FreeTextEntry1] : \par 1. EKG today reveals sinus rhythm at 66 bpm.  Qs in leads II, III, and aVF.  Non-specific ST-T changes.  \par \par 2. Hyperlipidemia: Review of recent bloodwork demonstrates a total cholesterol of 154, HDL 50, LDL 87, triglycerides 190.  Patient currently on 20 mg of Atorvastatin q.h.s.  Have advised him to augment this to 40 mg q.h.s. and follow a stricter low-fat / low-cholesterol diet given his known mild coronary artery disease.  Will undergo fasting bloodwork in approximately eight weeks.  \par \par 3. Lower extremity vascular calcification:  Patient asymptomatic but will undergo lower extremity arterial duplex for further evaluation.  If clinically stable, office visit three months. \par \par \par

## 2023-03-29 NOTE — HISTORY OF PRESENT ILLNESS
[FreeTextEntry1] : From a cardiac standpoint, Mr. Csatle denies exertional chest pain, shortness of breath, or other cardiac symptoms.  He recently underwent x-rays for a foot injury and was found to have foot calcifications, possibly suggesting peripheral artery disease.  \par \par

## 2023-04-17 ENCOUNTER — APPOINTMENT (OUTPATIENT)
Dept: CARDIOLOGY | Facility: CLINIC | Age: 63
End: 2023-04-17
Payer: COMMERCIAL

## 2023-04-17 PROCEDURE — 93925 LOWER EXTREMITY STUDY: CPT

## 2023-06-26 ENCOUNTER — LABORATORY RESULT (OUTPATIENT)
Age: 63
End: 2023-06-26

## 2023-08-09 ENCOUNTER — APPOINTMENT (OUTPATIENT)
Dept: CARDIOLOGY | Facility: CLINIC | Age: 63
End: 2023-08-09
Payer: COMMERCIAL

## 2023-08-09 VITALS
RESPIRATION RATE: 16 BRPM | HEART RATE: 53 BPM | WEIGHT: 224 LBS | SYSTOLIC BLOOD PRESSURE: 112 MMHG | HEIGHT: 73 IN | DIASTOLIC BLOOD PRESSURE: 78 MMHG | BODY MASS INDEX: 29.69 KG/M2

## 2023-08-09 DIAGNOSIS — I83.93 ASYMPTOMATIC VARICOSE VEINS OF BILATERAL LOWER EXTREMITIES: ICD-10-CM

## 2023-08-09 DIAGNOSIS — R94.31 ABNORMAL ELECTROCARDIOGRAM [ECG] [EKG]: ICD-10-CM

## 2023-08-09 DIAGNOSIS — G47.30 SLEEP APNEA, UNSPECIFIED: ICD-10-CM

## 2023-08-09 DIAGNOSIS — I10 ESSENTIAL (PRIMARY) HYPERTENSION: ICD-10-CM

## 2023-08-09 PROCEDURE — 99214 OFFICE O/P EST MOD 30 MIN: CPT | Mod: 25

## 2023-08-09 PROCEDURE — 93000 ELECTROCARDIOGRAM COMPLETE: CPT

## 2023-08-09 RX ORDER — METHYLPREDNISOLONE 4 MG/1
4 TABLET ORAL
Qty: 1 | Refills: 0 | Status: DISCONTINUED | COMMUNITY
Start: 2022-05-02 | End: 2023-08-09

## 2023-08-09 RX ORDER — AZELASTINE HYDROCHLORIDE 137 UG/1
137 SPRAY, METERED NASAL
Refills: 0 | Status: ACTIVE | COMMUNITY

## 2023-08-09 RX ORDER — DEXLANSOPRAZOLE 30 MG/1
30 CAPSULE, DELAYED RELEASE ORAL
Refills: 0 | Status: ACTIVE | COMMUNITY

## 2023-08-09 NOTE — ASSESSMENT
[FreeTextEntry1] : 1. EKG today reveals sinus barry at 53 bpm.  Qs in leads II, III, and aVF.  Early R wave transition V1 to V2. Non-specific ST-T changes.    Recommend he complete a Transthoracic Echocardiogram prior to follow up to assess overall cardiac function and valvulopathy.  2. Hyperlipidemia: Review of recent bloodwork demonstrates much improved lipid levels.  Patient currently on increased dosage of Lipitor to 40 mg q.h.s. and follows a stricter low-fat / low-cholesterol diet given his known mild coronary artery disease.  Will undergo fasting bloodwork with his PCP prior to follow up.    3. Lower extremity vascular calcification:  Patient asymptomatic and most recent lower extremity arterial duplex unremarkable with only mild atherosclerosis.     4. Lower extremity varicose veins:  Despite patient having only minimal varicose veins mostly located on RLE thigh, will provide referral to Vascular Surgery Specialist to discuss possible treatment ?vein ablation.    5.  Follow up with Dr. Irene in 5-6 months or PRN.

## 2023-08-09 NOTE — REASON FOR VISIT
[FreeTextEntry1] : Mr. Castle is a pleasant 62-year-old white male with a past medical history significant for hyperlipidemia as well as non-obstructive coronary artery disease with known 30% stenosis of his LAD as well as asthma, GERD, and sleep apnea (not currently on CPAP), who presents for follow up evaluation.

## 2023-08-09 NOTE — HISTORY OF PRESENT ILLNESS
[FreeTextEntry1] : From a cardiac standpoint, Mr. Castle denies exertional chest pain, shortness of breath, or other cardiac symptoms.  He earlier in the year underwent x-rays for a foot injury and was found to have foot calcifications, possibly suggesting peripheral artery disease.    Follow up lower extremity arterial doppler demonstrated only mild atherosclerosis in lower extremities bilaterally.    Atorvastatin was increased from 20 to 40 mg nightly at last office visit.  Repeat fasting labs (6/26/2023) demonstrates more favorable lipid levels with LDL 61, Total cholesterol 128, HDL 54, Triglycerides 65.    Patient has very mild varicose veins located mostly on his RLE thigh, but he's requesting to consult with Vascular Surgery to discuss possible treatment options because he doesn't like the appearance of these veins.

## 2023-08-09 NOTE — PHYSICAL EXAM
[General Appearance - Well Developed] : well developed [General Appearance - Well Nourished] : well nourished [General Appearance - In No Acute Distress] : no acute distress [Normal Conjunctiva] : the conjunctiva exhibited no abnormalities [Normal Oral Mucosa] : normal oral mucosa [] : no respiratory distress [Heart Rate And Rhythm] : heart rate and rhythm were normal [Heart Sounds] : normal S1 and S2 [Bowel Sounds] : normal bowel sounds [Abnormal Walk] : normal gait [Skin Turgor] : normal skin turgor [Skin Lesions] : no skin lesions [No Skin Ulcers] : no skin ulcer [FreeTextEntry1] : very mild varicose veins mostly located on RLE thigh [Oriented To Time, Place, And Person] : oriented to person, place, and time [Affect] : the affect was normal [Mood] : the mood was normal

## 2024-01-09 ENCOUNTER — APPOINTMENT (OUTPATIENT)
Dept: CARDIOLOGY | Facility: CLINIC | Age: 64
End: 2024-01-09

## 2024-01-12 ENCOUNTER — APPOINTMENT (OUTPATIENT)
Dept: CARDIOLOGY | Facility: CLINIC | Age: 64
End: 2024-01-12

## 2024-03-25 ENCOUNTER — APPOINTMENT (OUTPATIENT)
Dept: CARDIOLOGY | Facility: CLINIC | Age: 64
End: 2024-03-25
Payer: COMMERCIAL

## 2024-03-25 PROCEDURE — 93306 TTE W/DOPPLER COMPLETE: CPT

## 2024-04-19 ENCOUNTER — APPOINTMENT (OUTPATIENT)
Dept: CARDIOLOGY | Facility: CLINIC | Age: 64
End: 2024-04-19
Payer: COMMERCIAL

## 2024-04-19 ENCOUNTER — NON-APPOINTMENT (OUTPATIENT)
Age: 64
End: 2024-04-19

## 2024-04-19 VITALS
RESPIRATION RATE: 16 BRPM | WEIGHT: 226 LBS | HEART RATE: 61 BPM | HEIGHT: 73 IN | SYSTOLIC BLOOD PRESSURE: 116 MMHG | DIASTOLIC BLOOD PRESSURE: 76 MMHG | BODY MASS INDEX: 29.95 KG/M2

## 2024-04-19 DIAGNOSIS — I77.810 THORACIC AORTIC ECTASIA: ICD-10-CM

## 2024-04-19 DIAGNOSIS — I73.9 PERIPHERAL VASCULAR DISEASE, UNSPECIFIED: ICD-10-CM

## 2024-04-19 DIAGNOSIS — I25.10 ATHEROSCLEROTIC HEART DISEASE OF NATIVE CORONARY ARTERY W/OUT ANGINA PECTORIS: ICD-10-CM

## 2024-04-19 DIAGNOSIS — E78.00 PURE HYPERCHOLESTEROLEMIA, UNSPECIFIED: ICD-10-CM

## 2024-04-19 PROCEDURE — 93000 ELECTROCARDIOGRAM COMPLETE: CPT

## 2024-04-19 PROCEDURE — 99214 OFFICE O/P EST MOD 30 MIN: CPT

## 2024-04-26 ENCOUNTER — LABORATORY RESULT (OUTPATIENT)
Age: 64
End: 2024-04-26

## 2024-05-01 ENCOUNTER — APPOINTMENT (OUTPATIENT)
Dept: CT IMAGING | Facility: CLINIC | Age: 64
End: 2024-05-01
Payer: COMMERCIAL

## 2024-05-01 ENCOUNTER — OUTPATIENT (OUTPATIENT)
Dept: OUTPATIENT SERVICES | Facility: HOSPITAL | Age: 64
LOS: 1 days | End: 2024-05-01
Payer: COMMERCIAL

## 2024-05-01 DIAGNOSIS — I77.810 THORACIC AORTIC ECTASIA: ICD-10-CM

## 2024-05-01 DIAGNOSIS — Z98.890 OTHER SPECIFIED POSTPROCEDURAL STATES: Chronic | ICD-10-CM

## 2024-05-01 PROCEDURE — 71275 CT ANGIOGRAPHY CHEST: CPT | Mod: 26

## 2024-05-01 PROCEDURE — 71275 CT ANGIOGRAPHY CHEST: CPT

## 2024-05-16 ENCOUNTER — NON-APPOINTMENT (OUTPATIENT)
Age: 64
End: 2024-05-16

## 2024-05-28 ENCOUNTER — APPOINTMENT (OUTPATIENT)
Dept: CARDIOLOGY | Facility: CLINIC | Age: 64
End: 2024-05-28

## 2024-06-17 RX ORDER — ATORVASTATIN CALCIUM 40 MG/1
40 TABLET, FILM COATED ORAL DAILY
Qty: 90 | Refills: 1 | Status: ACTIVE | COMMUNITY
Start: 1900-01-01 | End: 1900-01-01

## 2024-08-05 ENCOUNTER — APPOINTMENT (OUTPATIENT)
Dept: CARDIOLOGY | Facility: CLINIC | Age: 64
End: 2024-08-05

## 2024-08-19 NOTE — ED ADULT TRIAGE NOTE - HEART RATE (BEATS/MIN)
Medication: Amitriptyline passed protocol.   Last office visit date: 6/11/2024  Next appointment scheduled?: Yes   Number of refills given: 2    
56

## 2024-11-18 NOTE — ED ADULT NURSE NOTE - NS ED NURSE LEVEL OF CONSCIOUSNESS AFFECT
Please arrive 20 minutes prior to your scheduled time to see the doctor to allow sufficient time for check-in and rooming.      Please bring all your prescription bottles to each appointment.      I recommend all standard healthcare maintenance and cancer screenings (Colon cancer screening if due, Lung cancer screening if due, Mammogram and Bone Density if female and appropriate, etc) and standard immunizations (Flu, Pneumonia, Covid-19, RSV, Shingrix, Tetanus boosters, etc) as appropriate and due to lower your risk for potentially preventable morbidity/mortality from these diseases.     Check BP 1-2 times per week.  Call our office if BP runs above 140/80.     Recommend tobacco cessation if you use tobacco products.    
Calm

## 2025-02-06 ENCOUNTER — NON-APPOINTMENT (OUTPATIENT)
Age: 65
End: 2025-02-06

## 2025-02-06 ENCOUNTER — APPOINTMENT (OUTPATIENT)
Dept: CARDIOLOGY | Facility: CLINIC | Age: 65
End: 2025-02-06
Payer: COMMERCIAL

## 2025-02-06 VITALS
HEART RATE: 76 BPM | SYSTOLIC BLOOD PRESSURE: 104 MMHG | WEIGHT: 225 LBS | BODY MASS INDEX: 29.82 KG/M2 | RESPIRATION RATE: 16 BRPM | OXYGEN SATURATION: 98 % | HEIGHT: 73 IN | DIASTOLIC BLOOD PRESSURE: 70 MMHG

## 2025-02-06 DIAGNOSIS — I10 ESSENTIAL (PRIMARY) HYPERTENSION: ICD-10-CM

## 2025-02-06 DIAGNOSIS — I25.10 ATHEROSCLEROTIC HEART DISEASE OF NATIVE CORONARY ARTERY W/OUT ANGINA PECTORIS: ICD-10-CM

## 2025-02-06 DIAGNOSIS — I77.810 THORACIC AORTIC ECTASIA: ICD-10-CM

## 2025-02-06 DIAGNOSIS — E78.00 PURE HYPERCHOLESTEROLEMIA, UNSPECIFIED: ICD-10-CM

## 2025-02-06 PROCEDURE — 99214 OFFICE O/P EST MOD 30 MIN: CPT

## 2025-02-06 PROCEDURE — 93000 ELECTROCARDIOGRAM COMPLETE: CPT

## 2025-04-08 ENCOUNTER — APPOINTMENT (OUTPATIENT)
Dept: CARDIOLOGY | Facility: CLINIC | Age: 65
End: 2025-04-08

## 2025-07-09 NOTE — ED PROCEDURE NOTE - LENGTH OF LACERATION
[facility] provides services at a reduced cost to those who are determined to be eligible through [facility]’s financial assistance program. Information regarding [facility]’s financial assistance program can be found by going to [email or web address] or by calling [phone].

## 2025-08-14 ENCOUNTER — OUTPATIENT (OUTPATIENT)
Dept: OUTPATIENT SERVICES | Facility: HOSPITAL | Age: 65
LOS: 1 days | End: 2025-08-14
Payer: COMMERCIAL

## 2025-08-14 ENCOUNTER — APPOINTMENT (OUTPATIENT)
Dept: MRI IMAGING | Facility: CLINIC | Age: 65
End: 2025-08-14

## 2025-08-14 DIAGNOSIS — R10.84 GENERALIZED ABDOMINAL PAIN: ICD-10-CM

## 2025-08-14 DIAGNOSIS — Z98.890 OTHER SPECIFIED POSTPROCEDURAL STATES: Chronic | ICD-10-CM

## 2025-08-14 PROCEDURE — 74183 MRI ABD W/O CNTR FLWD CNTR: CPT

## 2025-08-14 PROCEDURE — 74183 MRI ABD W/O CNTR FLWD CNTR: CPT | Mod: 26

## 2025-08-14 PROCEDURE — 99156 MOD SED OTH PHYS/QHP 5/>YRS: CPT

## 2025-08-14 PROCEDURE — 99157 MOD SED OTHER PHYS/QHP EA: CPT

## 2025-08-14 PROCEDURE — A9585: CPT

## 2025-09-15 ENCOUNTER — RX CHANGE (OUTPATIENT)
Age: 65
End: 2025-09-15

## 2025-09-16 ENCOUNTER — RX CHANGE (OUTPATIENT)
Age: 65
End: 2025-09-16